# Patient Record
(demographics unavailable — no encounter records)

---

## 2025-06-26 NOTE — PLAN
[TextEntry] : A/P, 46M w/ ED for f/u related to malfunction of penile prosthesis, now with very delayed ejaculation  - pt to send operative reports from Dr. Patel  - discussed it may take up to a year for sensation and normal ejaculation to return to normal after surgery if it is to improve  - discussed potential ability to perform removal of malleable with replacement of inflatable, possible grafting, vs. malleable one side, inflatable on second side, staged vs. one procedure depending on what op report shows. 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   - something structural feels off on the left side, right side feels okay, cylinder slightly over sized  - f/u 2 weeks to decide final plan  -Operative report reviewed: a 5 x 6 cm defect is reported after plaque incision/excision. This was patched with EVARREST 6x7 graft which is used for hemostasis and is absorbable. This could possibly explain the lack of tunica on the left side.  The patient presents with the chief complaint of malfunction of his penile implant. The patient scheduled this consultation to discuss the different treatment options available for his malfunctioning implant. The following note describes the conversation that was performed today during the consultation.   I reviewed the Patient History Form which the patient filled out. In discussing penile implant replacement surgery, the patient was made aware of the different types of penile implants- including semirigid devices, 2-piece or Ambicor (AMS) devices, and the inflatable penile prosthesis with 3 components. I went on to mention that there are 2 brands of devices, Coloplast and AMS, and that the AMS is impregnated with antibiotic (inhibizone), and the Coloplast is dipped then coated with an antibiotic. I also referred him to my website in order to obtain additional information about the types of implants available.  He felt he would defer to my judgment as to which device to use.   I also described the highlights and benefits of the "No-Touch" surgical technique and outcome data including number of procedures previously performed and updated rate of infection. In this initial discussion of the penile implant option, I made sure we had a very long and charles discussion about the risks.  I stated that, first and foremost, infection is the most dreaded risk and complication, which range in incidence from 1-3% of all cases performed in the [place], but that in my hands, using the "No-Touch" technique the incidence of infection is less than 1%.  I stated that should infection occur, the entire device would need to be removed, which typically happens in the first several weeks after surgery.  I explained that should this occur, there would likely be corporal fibrosis, scarring, penile shortening and even penile necrosis and disfigurement.  I said that while I would do absolutely everything possible to reduce and mitigate this risk, if it occur, the device would have to be removed, and then a salvage procedure with a semi-rigid implant possibly done, or the device would have to be removed with delayed re-implantation, or simply avoid future surgery completely.  I explained what this salvage procedure is, and that a new implant could be placed in the same setting with a complex irrigation of antibiotics and saline lavage, but that the infection risk at this salvage procedure is even higher, up to 30%. Furthermore the possible need for hospitalization, prolonged intravenous antibiotics and need further additional surgery was also discussed.  The patient was informed that if the salvage operation failed or if the infected implant were to be removed completely that significant shortening of the penis would occur making implantation of another device very difficult with very poor outcome and patient satisfaction. I explained that this is a real and significant risk that has to be weighed and considered.   Next I expounded on the other risks of the operation.  These include injury to the urethra or bladder, and should these occur, the operation would have to be altered or aborted.  I explained that very rarely, vascular injury and bleeding can happen, and if iliac vein injury occurs from reservoir placement, this could be catastrophic and result in major blood loss and theoretically risk of leg loss in severe instances.    I went on to discuss that after the implant is placed, penile shortening could likely occur, and this is up to 1-2cm total.  Some of this is due to lack of glans engorgement, though MUSE could be used post-operatively to reduce this factor.  Next I also explained the risk of dissatisfaction with the cosmetic or functional result of the device, meaning that he could simply be unhappy with the result.  Some people find that while they have a good full erection, they have changes in sensation, difficulty obtaining an orgasm, and dissatisfaction with sex in general.  I made sure he verbalized and demonstrated a good understanding of these points.   Next, I explained the risk of device breakage or failure, and future operations might be needed should this occur to fix the device tubing breakages with fluid leaks.  There is also a risk of auto-inflation, and even inability to successfully use the device due to technical considerations and inability to use or find the pump.  I did state that I would be available to him to teach him and train him to use his device, and also available to treat any other issue mentioned above such as device breakage or auto-inflation.   Next we discussed the fact that rarely further minor surgery may be needed to make final adjustments to the penile implant. Reasons for this would be to adjust the length of the cylinders, reposition the pump or location of the reservoir.   Prior to scheduling surgery, the patient was asked to read the material, which is provided to him today, to see a cardiologist to obtain a medical clearance, to visit our website [email or web address]   to obtain additional information, to call patients who were previously implanted and to discuss his options with his partner. Before leaving the consultation, I made sure he verbalized understanding all the risk and benefits, and pros and cons of replacement surgery.  He had the ability to ask questions, and I also explained to him what to expect from the surgery.

## 2025-06-26 NOTE — PHYSICAL EXAM
[General Appearance - Well Developed] : well developed [General Appearance - Well Nourished] : well nourished [Heart Rate And Rhythm] : heart rate and rhythm were normal [] : no respiratory distress [Respiration, Rhythm And Depth] : normal respiratory rhythm and effort [Bowel Sounds] : normal bowel sounds [Abdomen Soft] : soft [Chaperone Present] : A chaperone was present in the examining room during all aspects of the physical examination [FreeTextEntry2] : Jaime Lui NP [TextEntry] : Penile implant examination:  CURVATURE TO LEFT SIDE (30 DEGREES), UNSTABLE/FLOPPY ERECTION WITH MARGINAL RIGIDITY ASYMMETRY OF RIGHT AND LEFT CORPORA, ALSO WIDE PROXIMAL SHAFT WITH NARROWER DISTAL 1/3 OF PENILE SHAFT UNABLE TO PALPATE LEFT TUNICA, THERE SEEMS TO BE A DEFECT OF THE LEFT TUNICA AT THE MID TO PROXIMAL SHAFT, THE CYLINDER IS PALPABEL UNDERNEATH THE SKIN HARD FUSIFORM PALPABLE PLAQUE/DEVICE? 4 X 2CM FELT IN CENTER OF BASE OF PENIS (UNCLEAR IF THIS IS ROLY HERNIATING THROUGH CORPORA OR IF LEFT ROLY IS IN CORRECT SPACE) OR IF ITS A GRAFT ABLE TO PALPATE RIGHT ROLY IN TUNICA  RIGHT ROLY TOO FAR INTO GLANS PENIS

## 2025-06-26 NOTE — HISTORY OF PRESENT ILLNESS
[FreeTextEntry1] : 46M w/ ED for f/u related to malfunction of prosthesis pt last seen by Dr Abernathy > 3 yrs ago  Reports late-onset DM. Tried PDE5i, failed. Tried Trimix with development of worsening scar tissue. 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  Saw Dr. Patel (took his insurance) who recommended circumcision incision, with plaque excision and grafting with malleable prosthesis. He underwent malleable prosthesis placement 3/28/2025 Reports significant pain after malleable placement, no pain meds. Went into urinary retention post-op due to pain.  Reports curvature post op to the left, has delayed ejaculation (1.5 hours), wider at base, thinner near head. Pt states he never had curvature prior to surgery, just indentation. . Has not been able to use this penile prosthesis for sexual activity with his .  Had urinary retention post-op but no urinary symptoms currently.  Significant psychological stress from malfunction of prosthesis.   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

## 2025-06-26 NOTE — PHYSICAL EXAM
[General Appearance - Well Developed] : well developed [General Appearance - Well Nourished] : well nourished [Heart Rate And Rhythm] : heart rate and rhythm were normal [] : no respiratory distress [Bowel Sounds] : normal bowel sounds [Respiration, Rhythm And Depth] : normal respiratory rhythm and effort [Abdomen Soft] : soft [Chaperone Present] : A chaperone was present in the examining room during all aspects of the physical examination [FreeTextEntry2] : Jaime Lui NP [TextEntry] : Penile implant examination:  CURVATURE TO LEFT SIDE (30 DEGREES), UNSTABLE/FLOPPY ERECTION WITH MARGINAL RIGIDITY ASYMMETRY OF RIGHT AND LEFT CORPORA, ALSO WIDE PROXIMAL SHAFT WITH NARROWER DISTAL 1/3 OF PENILE SHAFT UNABLE TO PALPATE LEFT TUNICA, THERE SEEMS TO BE A DEFECT OF THE LEFT TUNICA AT THE MID TO PROXIMAL SHAFT, THE CYLINDER IS PALPABEL UNDERNEATH THE SKIN HARD FUSIFORM PALPABLE PLAQUE/DEVICE? 4 X 2CM FELT IN CENTER OF BASE OF PENIS (UNCLEAR IF THIS IS ROLY HERNIATING THROUGH CORPORA OR IF LEFT ROLY IS IN CORRECT SPACE) OR IF ITS A GRAFT ABLE TO PALPATE RIGHT ROLY IN TUNICA  RIGHT ROLY TOO FAR INTO GLANS PENIS

## 2025-06-26 NOTE — END OF VISIT
[Time Spent: ___ minutes] : I have spent [unfilled] minutes of time on the encounter which excludes teaching and separately reported services. [FreeTextEntry3] : The complete time calculation (  63 minutes) for this patient encounter, which excludes teaching and separately reported services, but includes a review of the patient's past medical records pertinent to his chief complaint, including medical, and surgical history, review of medications taken and of previous visits with other health care providers. In addition to the time spent with the patient, the total time calculation also includes the time necessary to document all of the information gathered during this session into the patient's electronic health records.

## 2025-07-10 NOTE — HISTORY OF PRESENT ILLNESS
[FreeTextEntry1] : 46M w/ ED for 2-week f/u and to discuss plans for possible RR of IPP  - pt seen initially 2 wks ago due to malfunction of penile prosthesis, now with very delayed ejaculation pt was advised at that time: discussed it may take up to a year for sensation and normal ejaculation to return to normal after surgery if it is to improve discussed potential ability to perform removal of malleable with replacement of inflatable, possible grafting, vs. malleable one side, inflatable on second side, staged vs. one procedure depending on what op report shows.  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 Saw Dr. Patel (took his insurance) who recommended circumcision incision, with plaque excision and grafting with malleable prosthesis. He underwent malleable prosthesis placement 3/28/2025 Reports significant pain after malleable placement, no pain meds. Went into urinary retention post-op due to pain. Reports curvature post op to the left, has delayed ejaculation (1.5 hours), wider at base, thinner near head. Pt states he never had curvature prior to surgery, just indentation. . Has not been able to use this penile prosthesis for sexual activity with his . Had urinary retention post-op but no urinary symptoms currently. Significant psychological stress from malfunction of prosthesis.  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

## 2025-07-10 NOTE — PLAN
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  Once he has new insurance, he will determine whether or not to move forward with surgery with me

## 2025-07-10 NOTE — END OF VISIT
[Time Spent: ___ minutes] : I have spent [unfilled] minutes of time on the encounter which excludes teaching and separately reported services. [FreeTextEntry3] : The complete time calculation (  43 minutes) for this patient encounter, which excludes teaching and separately reported services, but includes a review of the patient's past medical records pertinent to his chief complaint, including medical, and surgical history, review of medications taken and of previous visits with other health care providers. In addition to the time spent with the patient, the total time calculation also includes the time necessary to document all of the information gathered during this session into the patient's electronic health records.

## 2025-07-10 NOTE — ASSESSMENT
[FreeTextEntry1] : The patient does not want to pursue his care with the Dr. Patel.  I have explained to the patient that based on the physical examination it is very difficult to determine what will be possible to do once surgery starts.  I explained to him that we would have to remove both cylinders reassess the structural aspect of the corpora and determine whether or not an inflatable implant is possible.  I explained to him that further shortening of the penis may occur because of the tortuous appearance of the cylinders.  The patient is quite dissatisfied with his current outcome and states that the erection is not usable for sexual activity.  This device will need to be replaced completely.  I have also told the patient that it is possible that another malleable may be needed if the structural anatomy of the corpora is altered by the presence of the large defect.  It seems that the patient had a large plaque removed which is 5 x 6 cm which resulted in a large defect requiring a graft of Evarrest.  The breast being a hemostatic agent which is absorbable I am not quite sure whether this will provide and the structural integrity to the penis which is comparable to tunica albuginea.

## 2025-07-10 NOTE — PHYSICAL EXAM
[General Appearance - Well Developed] : well developed [General Appearance - Well Nourished] : well nourished [Heart Rate And Rhythm] : heart rate and rhythm were normal [] : no respiratory distress [Respiration, Rhythm And Depth] : normal respiratory rhythm and effort [Bowel Sounds] : normal bowel sounds [Abdomen Soft] : soft [Chaperone Present] : A chaperone was present in the examining room during all aspects of the physical examination [FreeTextEntry2] : Jaime Lui NP [TextEntry] : Examination of the implant is quite difficult.  The penis is curved and bent to the left.  There is also a slight hourglass deformity on the distal third of the shaft.  It seems that the right cylinder is on top of the left cylinder close to the base rather than being next to each other.  There is also a very discrete plaque measuring 3 x 1 cm on the dorsum of the penis which has a very unusual shape and texture.  It is unclear whether this represents curled up graft or plaque from Peyronie's disease.  In addition the right cylinder seem to be buckled in that area with the buckle protruding towards the left side of the penis.  There is no rigidity with the current device that would allow for sexual activity.  The cylinder on the left is very close to the skin and very palpable.